# Patient Record
(demographics unavailable — no encounter records)

---

## 2024-10-24 NOTE — HISTORY OF PRESENT ILLNESS
[FreeTextEntry8] : Pt has  c/o twiching in her right eye   with tightness around her eye. She has seen the eye doctor and no pathology found.  Has had the twiching for months  not  every day it comes and it   goes some days worse than others. Has also co crackling in her left  ear and occasional  pain   outside of  ear   which happens ccasioanlly lasting  a few seconds. These episodes occrur   couple of times per month.. Also notes her  left leg.  which she first noticed over the summer.   passed away from CHF so she is concerned. Only swelling   in the left leg and was worsened  in the summer.   No dyspnea.  No orthopnea.

## 2024-11-22 NOTE — HISTORY OF PRESENT ILLNESS
[FreeTextEntry1] : Ms. Ruth is a 70 yo female with a hx ulcerative colitis referred for leg swelling.   Leg swelling first started in summer but has been persistent - primarily left leg but occasionally both.  Also c/o chest pain unrelated to exertion; palpitations described as heart racing.  Reports dyspnea when climbing stairs.  PSHx: ovarian cyst removed  Tobacco use: quit 23 yo, smoked 1 ppd  Alcohol use: none Drug use: none  Caffeine: coffee one cup daily  Exercise: walking 1/2 hr 6 days week, yoga, pilates  Ob hx: 2 prior pregnancies/children, no complications  Family hx: no cardiac disease

## 2024-11-22 NOTE — REVIEW OF SYSTEMS
[Dyspnea on exertion] : dyspnea during exertion [Chest Discomfort] : chest discomfort [Lower Ext Edema] : lower extremity edema [Palpitations] : palpitations [Negative] : Heme/Lymph

## 2024-11-22 NOTE — ASSESSMENT
[FreeTextEntry1] : Chest pain  Dyspnea on exertion  will order coronary CT, echo   Palpitations will order TSH, cardiac monitor on next visit if persistent   Dyslipidemia  ASCVD score 7.7-8.2% will repeat lipid profile   will f/u in 2 months

## 2024-11-22 NOTE — HISTORY OF PRESENT ILLNESS
[FreeTextEntry1] : Ms. Ruth is a 72 yo female with a hx ulcerative colitis referred for leg swelling.   Leg swelling first started in summer but has been persistent - primarily left leg but occasionally both.  Also c/o chest pain unrelated to exertion; palpitations described as heart racing.  Reports dyspnea when climbing stairs.  PSHx: ovarian cyst removed  Tobacco use: quit 23 yo, smoked 1 ppd  Alcohol use: none Drug use: none  Caffeine: coffee one cup daily  Exercise: walking 1/2 hr 6 days week, yoga, pilates  Ob hx: 2 prior pregnancies/children, no complications  Family hx: no cardiac disease 0 (no pain/absence of nonverbal indicators of pain)

## 2025-03-06 NOTE — HISTORY OF PRESENT ILLNESS
[FreeTextEntry1] : Pt is here for follow up of multiple medical problems including  hyperlipidemia and CAD  discussd with her recent findings of cad and  cardiology advice she says she does not care and wishes to be DNR. I dw her  possible depression she denies says she does not want meds, just wants to live her life.